# Patient Record
(demographics unavailable — no encounter records)

---

## 2024-10-23 NOTE — REASON FOR VISIT
[New Patient Visit] : a new patient visit [FreeTextEntry1] : Pt is here with complaints of cervical pain.

## 2024-10-23 NOTE — DATA REVIEWED
[de-identified] : Was reviewed of the cervical spine (disc returned to the patient) -degenerative disc disease with with mild to moderate stenosis at C5-6 and most pronounced at C6-7

## 2024-10-23 NOTE — HISTORY OF PRESENT ILLNESS
[de-identified] : 85-year-old male presents to the neurosurgery office for an initial office visit for evaluation of neck and right shoulder pain.  The patient also reports pain that radiates down his arm and occasionally into his wrist.  He states that he sees the [facility] and an MRI of the cervical spine was ordered which is available for review today.  The patient goes to acupuncture and feels that it does help with his symptoms.  Patient is right-hand dominant.

## 2024-10-23 NOTE — PHYSICAL EXAM
[General Appearance - Alert] : alert [General Appearance - In No Acute Distress] : in no acute distress [General Appearance - Well Nourished] : well nourished [General Appearance - Well Developed] : well developed [General Appearance - Well-Appearing] : healthy appearing [] : normal voice and communication [Oriented To Time, Place, And Person] : oriented to person, place, and time [Impaired Insight] : insight and judgment were intact [Affect] : the affect was normal [Mood] : the mood was normal [Memory Recent] : recent memory was not impaired [Memory Remote] : remote memory was not impaired [Person] : oriented to person [Place] : oriented to place [Time] : oriented to time [Motor Tone] : muscle tone was normal in all four extremities [Involuntary Movements] : no involuntary movements were seen [No Muscle Atrophy] : normal bulk in all four extremities [Motor Handedness Right-Handed] : the patient is right hand dominant [Abnormal Walk] : normal gait [FreeTextEntry8] : The patient ambulates unassisted

## 2024-10-23 NOTE — ASSESSMENT
[FreeTextEntry1] : Discussed the history, physical examination findings, and recent imaging with the patient with all questions answered.  The cervical spine MRI has been reviewed with the patient which shows mild to moderate stenosis most pronounced at C6-7.  Discussed that there is no role for acute neurosurgical intervention for the noted spine findings.  Recommend conservative management with physical therapy and pain management as needed and as tolerated.  In addition the patient has been referred to the orthopedist for evaluation of his right shoulder (Dr. Tavares).  Discussed that there is no role for follow-up at this time.  The patient will follow-up with us as needed.